# Patient Record
Sex: MALE | Race: WHITE | NOT HISPANIC OR LATINO
[De-identification: names, ages, dates, MRNs, and addresses within clinical notes are randomized per-mention and may not be internally consistent; named-entity substitution may affect disease eponyms.]

---

## 2017-03-16 ENCOUNTER — TRANSCRIPTION ENCOUNTER (OUTPATIENT)
Age: 72
End: 2017-03-16

## 2017-06-07 ENCOUNTER — APPOINTMENT (OUTPATIENT)
Dept: CARDIOLOGY | Facility: CLINIC | Age: 72
End: 2017-06-07

## 2017-07-13 ENCOUNTER — APPOINTMENT (OUTPATIENT)
Dept: CARDIOLOGY | Facility: CLINIC | Age: 72
End: 2017-07-13

## 2017-09-07 ENCOUNTER — APPOINTMENT (OUTPATIENT)
Dept: SURGERY | Facility: CLINIC | Age: 72
End: 2017-09-07
Payer: MEDICARE

## 2017-09-07 VITALS
BODY MASS INDEX: 30.52 KG/M2 | RESPIRATION RATE: 16 BRPM | SYSTOLIC BLOOD PRESSURE: 155 MMHG | WEIGHT: 218 LBS | HEIGHT: 71 IN | TEMPERATURE: 97.9 F | OXYGEN SATURATION: 100 % | DIASTOLIC BLOOD PRESSURE: 87 MMHG | HEART RATE: 71 BPM

## 2017-09-07 DIAGNOSIS — Z80.7 FAMILY HISTORY OF OTHER MALIGNANT NEOPLASMS OF LYMPHOID, HEMATOPOIETIC AND RELATED TISSUES: ICD-10-CM

## 2017-09-07 DIAGNOSIS — Z78.9 OTHER SPECIFIED HEALTH STATUS: ICD-10-CM

## 2017-09-07 DIAGNOSIS — Z82.49 FAMILY HISTORY OF ISCHEMIC HEART DISEASE AND OTHER DISEASES OF THE CIRCULATORY SYSTEM: ICD-10-CM

## 2017-09-07 DIAGNOSIS — Z87.09 PERSONAL HISTORY OF OTHER DISEASES OF THE RESPIRATORY SYSTEM: ICD-10-CM

## 2017-09-07 DIAGNOSIS — Z80.42 FAMILY HISTORY OF MALIGNANT NEOPLASM OF PROSTATE: ICD-10-CM

## 2017-09-07 PROCEDURE — 99204 OFFICE O/P NEW MOD 45 MIN: CPT

## 2017-09-07 RX ORDER — OXYCODONE 5 MG/1
5 TABLET ORAL
Qty: 20 | Refills: 0 | Status: DISCONTINUED | COMMUNITY
Start: 2017-06-02

## 2017-09-07 RX ORDER — ASPIRIN 325 MG
325 TABLET ORAL
Qty: 30 | Refills: 0 | Status: DISCONTINUED | COMMUNITY
Start: 2017-06-02

## 2017-09-07 RX ORDER — UBIDECARENONE/VIT E ACET 100MG-5
1000 CAPSULE ORAL
Refills: 0 | Status: ACTIVE | COMMUNITY

## 2017-09-07 RX ORDER — ROSUVASTATIN CALCIUM 10 MG/1
10 TABLET, FILM COATED ORAL
Qty: 30 | Refills: 0 | Status: DISCONTINUED | COMMUNITY
Start: 2017-08-15

## 2017-09-07 RX ORDER — PANTOPRAZOLE 40 MG/1
40 TABLET, DELAYED RELEASE ORAL
Qty: 30 | Refills: 0 | Status: DISCONTINUED | COMMUNITY
Start: 2017-06-02

## 2017-09-07 RX ORDER — FUROSEMIDE 40 MG/1
40 TABLET ORAL
Qty: 30 | Refills: 0 | Status: DISCONTINUED | COMMUNITY
Start: 2017-06-02

## 2017-09-07 RX ORDER — PREDNISONE 20 MG/1
20 TABLET ORAL
Qty: 10 | Refills: 0 | Status: DISCONTINUED | COMMUNITY
Start: 2017-03-16

## 2017-09-07 RX ORDER — POTASSIUM CHLORIDE 1500 MG/1
20 TABLET, EXTENDED RELEASE ORAL
Qty: 30 | Refills: 0 | Status: DISCONTINUED | COMMUNITY
Start: 2017-06-02

## 2017-09-07 RX ORDER — ZOLPIDEM TARTRATE 10 MG/1
10 TABLET ORAL
Qty: 30 | Refills: 0 | Status: DISCONTINUED | COMMUNITY
Start: 2017-03-15

## 2017-09-07 RX ORDER — CIPROFLOXACIN AND DEXAMETHASONE 3; 1 MG/ML; MG/ML
0.3-0.1 SUSPENSION/ DROPS AURICULAR (OTIC)
Qty: 75 | Refills: 0 | Status: DISCONTINUED | COMMUNITY
Start: 2017-07-26

## 2017-09-07 RX ORDER — HYDROCHLOROTHIAZIDE 25 MG/1
25 TABLET ORAL
Qty: 90 | Refills: 0 | Status: DISCONTINUED | COMMUNITY
Start: 2017-04-26

## 2017-09-07 RX ORDER — SIMVASTATIN 20 MG/1
20 TABLET, FILM COATED ORAL
Qty: 30 | Refills: 0 | Status: DISCONTINUED | COMMUNITY
Start: 2017-06-22

## 2017-09-07 RX ORDER — QUINAPRIL HYDROCHLORIDE 10 MG/1
10 TABLET, FILM COATED ORAL
Qty: 90 | Refills: 0 | Status: DISCONTINUED | COMMUNITY
Start: 2017-04-26

## 2017-09-20 ENCOUNTER — APPOINTMENT (OUTPATIENT)
Dept: SURGERY | Facility: HOSPITAL | Age: 72
End: 2017-09-20

## 2017-10-02 ENCOUNTER — APPOINTMENT (OUTPATIENT)
Dept: SURGERY | Facility: CLINIC | Age: 72
End: 2017-10-02
Payer: MEDICARE

## 2017-10-02 VITALS
DIASTOLIC BLOOD PRESSURE: 88 MMHG | SYSTOLIC BLOOD PRESSURE: 162 MMHG | HEART RATE: 79 BPM | BODY MASS INDEX: 30.52 KG/M2 | WEIGHT: 218 LBS | HEIGHT: 71 IN

## 2017-10-02 PROCEDURE — 99203 OFFICE O/P NEW LOW 30 MIN: CPT

## 2017-10-05 ENCOUNTER — RECORD ABSTRACTING (OUTPATIENT)
Age: 72
End: 2017-10-05

## 2017-10-05 DIAGNOSIS — R42 DIZZINESS AND GIDDINESS: ICD-10-CM

## 2017-10-05 DIAGNOSIS — I25.2 OLD MYOCARDIAL INFARCTION: ICD-10-CM

## 2017-10-05 DIAGNOSIS — Z87.09 PERSONAL HISTORY OF OTHER DISEASES OF THE RESPIRATORY SYSTEM: ICD-10-CM

## 2017-10-05 RX ORDER — ZOLPIDEM TARTRATE 10 MG/1
10 TABLET, FILM COATED ORAL DAILY
Refills: 0 | Status: ACTIVE | COMMUNITY

## 2017-10-05 RX ORDER — ALLOPURINOL 300 MG/1
300 TABLET ORAL DAILY
Refills: 0 | Status: ACTIVE | COMMUNITY

## 2017-10-27 ENCOUNTER — APPOINTMENT (OUTPATIENT)
Dept: CARDIOLOGY | Facility: CLINIC | Age: 72
End: 2017-10-27
Payer: MEDICARE

## 2017-10-27 VITALS
BODY MASS INDEX: 30.66 KG/M2 | OXYGEN SATURATION: 95 % | SYSTOLIC BLOOD PRESSURE: 140 MMHG | DIASTOLIC BLOOD PRESSURE: 72 MMHG | WEIGHT: 219 LBS | HEIGHT: 71 IN | HEART RATE: 66 BPM

## 2017-10-27 DIAGNOSIS — Z00.00 ENCOUNTER FOR GENERAL ADULT MEDICAL EXAMINATION W/OUT ABNORMAL FINDINGS: ICD-10-CM

## 2017-10-27 DIAGNOSIS — E78.00 PURE HYPERCHOLESTEROLEMIA, UNSPECIFIED: ICD-10-CM

## 2017-10-27 DIAGNOSIS — K40.90 UNILATERAL INGUINAL HERNIA, W/OUT OBSTRUCTION OR GANGRENE, NOT SPECIFIED AS RECURRENT: ICD-10-CM

## 2017-10-27 PROCEDURE — 99215 OFFICE O/P EST HI 40 MIN: CPT

## 2017-10-27 PROCEDURE — 93000 ELECTROCARDIOGRAM COMPLETE: CPT

## 2017-10-27 RX ORDER — SIMVASTATIN 20 MG/1
20 TABLET, FILM COATED ORAL DAILY
Refills: 0 | Status: DISCONTINUED | COMMUNITY
End: 2017-10-27

## 2017-10-27 RX ORDER — ROSUVASTATIN CALCIUM 20 MG/1
20 TABLET, FILM COATED ORAL
Refills: 0 | Status: DISCONTINUED | COMMUNITY
End: 2017-10-27

## 2017-11-17 ENCOUNTER — APPOINTMENT (OUTPATIENT)
Dept: SURGERY | Facility: AMBULATORY SURGERY CENTER | Age: 72
End: 2017-11-17

## 2017-11-29 ENCOUNTER — OUTPATIENT (OUTPATIENT)
Dept: OUTPATIENT SERVICES | Facility: HOSPITAL | Age: 72
LOS: 1 days | End: 2017-11-29

## 2017-11-29 VITALS
SYSTOLIC BLOOD PRESSURE: 160 MMHG | RESPIRATION RATE: 16 BRPM | DIASTOLIC BLOOD PRESSURE: 94 MMHG | HEIGHT: 72 IN | WEIGHT: 222.01 LBS | HEART RATE: 60 BPM | TEMPERATURE: 98 F

## 2017-11-29 DIAGNOSIS — Z95.1 PRESENCE OF AORTOCORONARY BYPASS GRAFT: Chronic | ICD-10-CM

## 2017-11-29 DIAGNOSIS — I25.10 ATHEROSCLEROTIC HEART DISEASE OF NATIVE CORONARY ARTERY WITHOUT ANGINA PECTORIS: ICD-10-CM

## 2017-11-29 DIAGNOSIS — K40.90 UNILATERAL INGUINAL HERNIA, WITHOUT OBSTRUCTION OR GANGRENE, NOT SPECIFIED AS RECURRENT: ICD-10-CM

## 2017-11-29 DIAGNOSIS — Z98.61 CORONARY ANGIOPLASTY STATUS: Chronic | ICD-10-CM

## 2017-11-29 DIAGNOSIS — G47.33 OBSTRUCTIVE SLEEP APNEA (ADULT) (PEDIATRIC): ICD-10-CM

## 2017-11-29 DIAGNOSIS — Z98.890 OTHER SPECIFIED POSTPROCEDURAL STATES: Chronic | ICD-10-CM

## 2017-11-29 RX ORDER — ASPIRIN/CALCIUM CARB/MAGNESIUM 324 MG
1 TABLET ORAL
Qty: 0 | Refills: 0 | COMMUNITY

## 2017-11-29 RX ORDER — ERGOCALCIFEROL 1.25 MG/1
1 CAPSULE ORAL
Qty: 0 | Refills: 0 | COMMUNITY

## 2017-11-29 RX ORDER — CLOPIDOGREL BISULFATE 75 MG/1
1 TABLET, FILM COATED ORAL
Qty: 0 | Refills: 0 | COMMUNITY

## 2017-11-29 RX ORDER — FOLIC ACID 0.8 MG
1 TABLET ORAL
Qty: 0 | Refills: 0 | COMMUNITY

## 2017-11-29 RX ORDER — ROSUVASTATIN CALCIUM 5 MG/1
1 TABLET ORAL
Qty: 0 | Refills: 0 | COMMUNITY

## 2017-11-29 RX ORDER — ZOLPIDEM TARTRATE 10 MG/1
1 TABLET ORAL
Qty: 0 | Refills: 0 | COMMUNITY

## 2017-11-29 RX ORDER — METOPROLOL TARTRATE 50 MG
1 TABLET ORAL
Qty: 0 | Refills: 0 | COMMUNITY

## 2017-11-29 RX ORDER — ALLOPURINOL 300 MG
1 TABLET ORAL
Qty: 0 | Refills: 0 | COMMUNITY

## 2017-11-29 NOTE — H&P PST ADULT - FAMILY HISTORY
Family history of heart disease, brother age 55 MI, father   CABG X3, & mother       Father  Still living? Unknown  Family history of cancer, Age at diagnosis: Age Unknown     Mother  Still living? No  Family history of cancer, Age at diagnosis: Age Unknown

## 2017-11-29 NOTE — H&P PST ADULT - PMH
CAD (coronary artery disease)    Dyslipidemia    Gout    Hypertension    Inguinal hernia    Insomnia    Obstructive sleep apnea

## 2017-11-29 NOTE — H&P PST ADULT - DENTITION
After Visit Summary   7/26/2017    Aries Hilario    MRN: [id number]           Patient Information     Date Of Birth          1954        Visit Information        Provider Department      7/26/2017 10:20 AM Judy Peña APRN CNP [facility] PHYSICIAN HEART AT [facility]        Today's Diagnoses     Chest pain    -  1    CAD (coronary artery disease)        Unstable angina pectoris (H)          Care Instructions    Thank you for your  Heart Care visit today. Your provider has recommended the following:  Medication Changes:   1. START Imdur (Isosorbide Mononitrate) 30 mg every morning.  2. Use Nitroglycerin under your tongue as needed for chest pain rated 5/10 or greater.  Recommendations:  1. Cardiac Catheterization (angiogram) to be done at [facility] on: Friday, July 28th. Arrive at: 11:00 am.  If you need to contact [facility] for any reason, please call [phone]. Follow the instructions below.  Follow-up:  See Suyapa Nava NP for cardiology follow up in [place], [place] on: Thursday, August 3rd at 10:20 am to discuss the result and your plan of care going forward.  We kindly ask that you call cardiology scheduling at [phone] three months prior to requested revisit date to schedule future cardiology appointments.  Reminder:  1. Please bring in your current medication list or your medication, over the counter supplements and vitamin bottles as we will review these at each office visit.               [facility] HEART CARE  [facility]~[address]~[place], [place]~[address]  Questions about your visit today?  Call your Cardiology Clinic RN's-Sowmya Beck and/or Chasity Sanchez at [phone].  ANGIOGRAM  [facility]   [place], [place]   Contact [facility] scheduling if needed at [phone], Option#2 or [phone].      1. In preparation for your procedure, we require that you do the  "following:    a. Nothing to eat or drink after midnight if your procedure is before 12 noon.  b. Take your usual morning medications with a small sip of water on the day of your procedure unless you are on the following medications:    Aspirin:  o If you currently take Aspirin, continue taking your same dose.    Coumadin or Warfarin:  o Stop Coumadin or Warfarin on:  __________________________    Pradaxa or Xarelto:  o Stop Pradaxa or Xarelto on:  ______________________________    Diabetic:  o If you take Glucophage (metformin) do not take the morning of the procedure or for 2 days after the procedure. Contact your Primary Care MD regarding glucose control for the days you do not take Glucophage.  o If you are on other oral diabetic medications do not take on the morning of the procedure.  o If you take Insulin contact your Primary Care MD for the recommended dosage to take the morning of the procedure.    Diuretic (Water Pill):  o If you take a diuretic (water pill), do not take the morning of the procedure.    c. If you have an allergy to dye, please contact the [facility] office 4 days before your procedure at [phone] option 2, and ask for a nurse.    2. You will be unable to drive after your procedure; please arrange to have someone drive you home the day of your procedure. You will also need to make sure that there is a responsible adult with you for 24 hours after your procedure. Your procedure will be cancelled if you do not have transportation home or someone to stay with you for 24 hrs.     3. Your procedure will be done at [facility]. Please park in the  Skyway Ramp  on the west side of [facility] on [address]. Take the skyway over [facility] to the hospital. Please check in on the first floor, \"Skyway Welcome Desk\" which is one floor down from the skyway level.     4. If you have any questions about your upcoming procedure, please contact the nurse at [facility] at " [phone] or the nurse at [facility] at [phone], option#2.              Follow-ups after your visit        Your next 10 appointments already scheduled     Jul 28, 2017  1:00 PM CDT   Cath 90 Minute with SHCVR1   [facility] Cardiac Catheterization [facility] ([facility])    [address]  [place] [address]   [phone]            Aug 03, 2017 10:20 AM CDT   Return Visit with MERCY Ford CNP   [facility] PHYSICIAN HEART AT [facility] ([facility])    [address] [address] [phone]              Future tests that were ordered for you today     Open Future Orders        Priority Expected Expires Ordered    Cardiac Cath: Coronary Angiography Adult Order Routine 8/2/2017 7/21/2018 7/26/2017            Who to contact     If you have questions or need follow up information about today's clinic visit or your schedule please contact [facility] PHYSICIAN HEART AT [facility] directly at [phone].  Normal or non-critical lab and imaging results will be communicated to you by Kwaabhart, letter or phone within 4 business days after the clinic has received the results. If you do not hear from us within 7 days, please contact the clinic through Helpr or phone. If you have a critical or abnormal lab result, we will notify you by phone as soon as possible.  Submit refill requests through Helpr or call your pharmacy and they will forward the refill request to us. Please allow 3 business days for your refill to be completed.          Additional Information About Your Visit        Helpr Information     Helpr gives you secure access to your electronic health record. If you see a primary care provider, you can also send messages to your care team and make appointments. If you have questions, please call your primary care clinic.  If you do not have a primary care provider, please call [phone]  and they will assist you.        Care EveryWhere ID     This is your Care EveryWhere ID. This could be used by other organizations to access your [place] medical records  [id number]         Blood Pressure from Last 3 Encounters:   12/22/16 123/74   12/19/16 124/78   10/28/16 104/64    Weight from Last 3 Encounters:   12/22/16 107 kg (236 lb)   12/19/16 107 kg (236 lb)   10/28/16 102.8 kg (226 lb 9.6 oz)                 Today's Medication Changes          These changes are accurate as of: 7/26/17 11:10 AM.  If you have any questions, ask your nurse or doctor.               Start taking these medicines.        Dose/Directions    isosorbide mononitrate 30 MG 24 hr tablet   Commonly known as:  IMDUR   Used for:  Unstable angina pectoris (H)        Dose:  30 mg   Take 1 tablet (30 mg) by mouth daily   Quantity:  30 tablet   Refills:  3       nitroGLYcerin 0.4 MG sublingual tablet   Commonly known as:  NITROSTAT   Used for:  Unstable angina pectoris (H)        For chest pain place 1 tablet under the tongue every 5 minutes for 3 doses. If symptoms persist 5 minutes after 1st dose call 911.   Quantity:  25 tablet   Refills:  3         These medicines have changed or have updated prescriptions.        Dose/Directions    buPROPion 100 MG tablet   Commonly known as:  WELLBUTRIN   This may have changed:    - how much to take  - when to take this   Used for:  Moderate episode of recurrent major depressive disorder (H)        Dose:  75 mg   Take 0.75 tablets (75 mg) by mouth 2 times daily   Quantity:  90 tablet   Refills:  0         Stop taking these medicines if you haven't already. Please contact your care team if you have questions.     BRILINTA PO           lisinopril 10 MG tablet   Commonly known as:  PRINIVIL/ZESTRIL           sildenafil 100 MG cap/tab   Commonly known as:  VIAGRA                Where to get your medicines      These medications were sent to [facility] - [place], [place] - [address]   [address] [address]     Phone:  [phone]     isosorbide mononitrate 30 MG 24 hr tablet    nitroGLYcerin 0.4 MG sublingual tablet                Primary Care Provider Office Phone # Fax #    Elsa Hutchinson [phone]       [facility] [address]   [place] [facility] [address]        Equal Access to Services     JES BLAIR : Hadii aad ku hadasho Soomaali, waaxda luqadaha, qaybta kaalmada adeegyada, waxay idiin hayaan adeeg kharash la'aan ah. So [facility] [phone].    ATENCIÓN: Si habla espraj, tiene a bell disposición servicios gratuitos de asistencia lingüística. Llame al [phone].    We comply with applicable federal civil rights laws and [place] laws. We do not discriminate on the basis of race, color, national origin, age, disability sex, sexual orientation or gender identity.            Thank you!     Thank you for choosing [facility] PHYSICIAN HEART AT [facility]  for your care. Our goal is always to provide you with excellent care. Hearing back from our patients is one way we can continue to improve our services. Please take a few minutes to complete the written survey that you may receive in the mail after your visit with us. Thank you!             Your Updated Medication List - Protect others around you: Learn how to safely use, store and throw away your medicines at www.disposemymeds.org.          This list is accurate as of: 7/26/17 11:10 AM.  Always use your most recent med list.                   Brand Name Dispense Instructions for use Diagnosis    aspirin 81 MG tablet     30 tablet    Take 1 tablet (81 mg) by mouth daily    Coronary artery disease involving native coronary artery of native heart without angina pectoris       atorvastatin 40 MG tablet    LIPITOR    90 tablet    Take 1 tablet (40 mg) by mouth daily    Hyperlipidemia LDL goal <70       buPROPion 100 MG tablet    WELLBUTRIN    90 tablet    Take 0.75 tablets (75 mg) by  mouth 2 times daily    Moderate episode of recurrent major depressive disorder (H)       clopidogrel 75 MG tablet    PLAVIX    90 tablet    Take 1 tablet (75 mg) by mouth daily    NSTEMI (non-ST elevated myocardial infarction) (H)       hydrocortisone 2.5 % cream    ANUSOL-HC    28.35 g    Place rectally 2 times daily For up to one week    Anal pain       IRON SUPPLEMENT PO      Take 325 mg by mouth daily        isosorbide mononitrate 30 MG 24 hr tablet    IMDUR    30 tablet    Take 1 tablet (30 mg) by mouth daily    Unstable angina pectoris (H)       metoprolol 25 MG 24 hr tablet    TOPROL-XL    45 tablet    Take 1/2 tablet by mouth daily.    NSTEMI (non-ST elevated myocardial infarction) (H)       nitroGLYcerin 0.4 MG sublingual tablet    NITROSTAT    25 tablet    For chest pain place 1 tablet under the tongue every 5 minutes for 3 doses. If symptoms persist 5 minutes after 1st dose call 911.    Unstable angina pectoris (H)       oxybutynin 5 MG tablet    DITROPAN    60 tablet    Take 1-2 tablets (5-10 mg) by mouth At Bedtime    Erectile dysfunction due to arterial insufficiency       PROBIOTIC ADVANCED PO              denies loose teeth or dentures

## 2017-11-29 NOTE — H&P PST ADULT - PROBLEM SELECTOR PLAN 1
scheduled for right inguinal hernia repair on 12/8/2017  preop instructions, gi prophylaxis & surgical soap given  pt verbalized understanding

## 2017-11-29 NOTE — H&P PST ADULT - PRO PAIN LIFE ADAPT
decreased activity level/inability to sleep/decreased appetite/inability to concentrate/inability to work

## 2017-11-29 NOTE — H&P PST ADULT - PROBLEM SELECTOR PLAN 2
Eleinta Velázquez  (RN)  2017 11:38:26
pt on plavix and aspirin , s/p CABG X2 vessels in 5/2017  confirmed with Jossy in Dr Held Office pt to remain on plavix & aspirin.  Pending copy of cardiology eval & cath report   pt instructed to take metoprolol & allopurinol on morning of surgery.  copy of labs results & ekg report in chart

## 2017-11-29 NOTE — H&P PST ADULT - LAST CARDIAC ANGIOGRAM/IMAGING
[facility]  5/29/2017  non functioning stent [facility]  5/29/2017  "non functioning stent"

## 2017-11-29 NOTE — H&P PST ADULT - HISTORY OF PRESENT ILLNESS
71y/o male presents for preop eval for scheduled right inguinal hernia repair on 12/8/2017.   Pt states self detected on 12/4/2017 after experiencing discomfort in the area

## 2017-11-29 NOTE — H&P PST ADULT - NEGATIVE CARDIOVASCULAR SYMPTOMS
no dyspnea on exertion/no claudication/no orthopnea/no peripheral edema/no paroxysmal nocturnal dyspnea/no palpitations/no chest pain

## 2017-11-29 NOTE — H&P PST ADULT - PSH
H/O coronary angioplasty  2017  "1 stent inserted that was bypassed in May 2017"  H/O sinus surgery  X3 1988 - 1997  S/P CABG x 2  5/29/2017

## 2017-11-29 NOTE — H&P PST ADULT - LYMPHATIC
supraclavicular L/anterior cervical L/anterior cervical R/supraclavicular R/posterior cervical L/posterior cervical R

## 2017-12-08 ENCOUNTER — APPOINTMENT (OUTPATIENT)
Dept: SURGERY | Facility: AMBULATORY SURGERY CENTER | Age: 72
End: 2017-12-08

## 2017-12-08 ENCOUNTER — OUTPATIENT (OUTPATIENT)
Dept: OUTPATIENT SERVICES | Facility: HOSPITAL | Age: 72
LOS: 1 days | Discharge: HOME | End: 2017-12-08
Payer: MEDICARE

## 2017-12-08 VITALS
DIASTOLIC BLOOD PRESSURE: 71 MMHG | OXYGEN SATURATION: 97 % | TEMPERATURE: 98 F | HEART RATE: 60 BPM | WEIGHT: 222.01 LBS | SYSTOLIC BLOOD PRESSURE: 159 MMHG | HEIGHT: 72 IN | RESPIRATION RATE: 18 BRPM

## 2017-12-08 VITALS — TEMPERATURE: 97 F

## 2017-12-08 DIAGNOSIS — Z95.1 PRESENCE OF AORTOCORONARY BYPASS GRAFT: Chronic | ICD-10-CM

## 2017-12-08 DIAGNOSIS — Z98.61 CORONARY ANGIOPLASTY STATUS: Chronic | ICD-10-CM

## 2017-12-08 DIAGNOSIS — K40.90 UNILATERAL INGUINAL HERNIA, WITHOUT OBSTRUCTION OR GANGRENE, NOT SPECIFIED AS RECURRENT: ICD-10-CM

## 2017-12-08 DIAGNOSIS — Z98.890 OTHER SPECIFIED POSTPROCEDURAL STATES: Chronic | ICD-10-CM

## 2017-12-08 PROCEDURE — 49505 PRP I/HERN INIT REDUC >5 YR: CPT | Mod: AS

## 2017-12-08 PROCEDURE — 49505 PRP I/HERN INIT REDUC >5 YR: CPT

## 2017-12-08 NOTE — ASU DISCHARGE PLAN (ADULT/PEDIATRIC). - NOTIFY
Swelling that continues/Unable to Urinate/Fever greater than 101/Bleeding that does not stop/Persistent Nausea and Vomiting

## 2017-12-08 NOTE — BRIEF OPERATIVE NOTE - PROCEDURE
<<-----Click on this checkbox to enter Procedure Herniorrhaphy, inguinal, right  12/08/2017  with progrip mesh  Active  ADEOLVEI

## 2017-12-09 ENCOUNTER — TRANSCRIPTION ENCOUNTER (OUTPATIENT)
Age: 72
End: 2017-12-09

## 2017-12-13 ENCOUNTER — APPOINTMENT (OUTPATIENT)
Dept: SURGERY | Facility: CLINIC | Age: 72
End: 2017-12-13
Payer: MEDICARE

## 2017-12-13 VITALS
TEMPERATURE: 97.4 F | DIASTOLIC BLOOD PRESSURE: 78 MMHG | SYSTOLIC BLOOD PRESSURE: 145 MMHG | HEART RATE: 64 BPM | HEIGHT: 71 IN | BODY MASS INDEX: 30.66 KG/M2 | WEIGHT: 219 LBS

## 2017-12-13 PROCEDURE — 99024 POSTOP FOLLOW-UP VISIT: CPT

## 2017-12-27 ENCOUNTER — APPOINTMENT (OUTPATIENT)
Dept: SURGERY | Facility: CLINIC | Age: 72
End: 2017-12-27
Payer: MEDICARE

## 2017-12-27 ENCOUNTER — APPOINTMENT (OUTPATIENT)
Dept: SURGERY | Facility: CLINIC | Age: 72
End: 2017-12-27

## 2017-12-27 VITALS
SYSTOLIC BLOOD PRESSURE: 138 MMHG | BODY MASS INDEX: 30.66 KG/M2 | DIASTOLIC BLOOD PRESSURE: 76 MMHG | HEIGHT: 71 IN | TEMPERATURE: 98.3 F | HEART RATE: 66 BPM | WEIGHT: 219 LBS

## 2017-12-27 DIAGNOSIS — Z09 ENCOUNTER FOR FOLLOW-UP EXAMINATION AFTER COMPLETED TREATMENT FOR CONDITIONS OTHER THAN MALIGNANT NEOPLASM: ICD-10-CM

## 2017-12-27 PROCEDURE — 99024 POSTOP FOLLOW-UP VISIT: CPT

## 2018-05-03 ENCOUNTER — APPOINTMENT (OUTPATIENT)
Dept: CARDIOLOGY | Facility: CLINIC | Age: 73
End: 2018-05-03

## 2018-09-05 ENCOUNTER — TRANSCRIPTION ENCOUNTER (OUTPATIENT)
Age: 73
End: 2018-09-05

## 2018-09-19 PROBLEM — K40.90 UNILATERAL INGUINAL HERNIA, WITHOUT OBSTRUCTION OR GANGRENE, NOT SPECIFIED AS RECURRENT: Chronic | Status: ACTIVE | Noted: 2017-11-29

## 2018-09-19 PROBLEM — I25.10 ATHEROSCLEROTIC HEART DISEASE OF NATIVE CORONARY ARTERY WITHOUT ANGINA PECTORIS: Chronic | Status: ACTIVE | Noted: 2017-11-29

## 2018-09-19 PROBLEM — I10 ESSENTIAL (PRIMARY) HYPERTENSION: Chronic | Status: ACTIVE | Noted: 2017-11-29

## 2018-09-19 PROBLEM — M10.9 GOUT, UNSPECIFIED: Chronic | Status: ACTIVE | Noted: 2017-11-29

## 2018-09-19 PROBLEM — E78.5 HYPERLIPIDEMIA, UNSPECIFIED: Chronic | Status: ACTIVE | Noted: 2017-11-29

## 2018-09-19 PROBLEM — G47.33 OBSTRUCTIVE SLEEP APNEA (ADULT) (PEDIATRIC): Chronic | Status: ACTIVE | Noted: 2017-11-29

## 2018-09-19 PROBLEM — G47.00 INSOMNIA, UNSPECIFIED: Chronic | Status: ACTIVE | Noted: 2017-11-29

## 2018-09-21 ENCOUNTER — APPOINTMENT (OUTPATIENT)
Dept: CT IMAGING | Facility: CLINIC | Age: 73
End: 2018-09-21
Payer: MEDICARE

## 2018-09-21 ENCOUNTER — OUTPATIENT (OUTPATIENT)
Dept: OUTPATIENT SERVICES | Facility: HOSPITAL | Age: 73
LOS: 1 days | End: 2018-09-21
Payer: MEDICARE

## 2018-09-21 DIAGNOSIS — Z95.1 PRESENCE OF AORTOCORONARY BYPASS GRAFT: Chronic | ICD-10-CM

## 2018-09-21 DIAGNOSIS — Z00.8 ENCOUNTER FOR OTHER GENERAL EXAMINATION: ICD-10-CM

## 2018-09-21 DIAGNOSIS — Z98.61 CORONARY ANGIOPLASTY STATUS: Chronic | ICD-10-CM

## 2018-09-21 DIAGNOSIS — Z98.890 OTHER SPECIFIED POSTPROCEDURAL STATES: Chronic | ICD-10-CM

## 2018-09-21 PROCEDURE — 70486 CT MAXILLOFACIAL W/O DYE: CPT

## 2018-09-21 PROCEDURE — 70486 CT MAXILLOFACIAL W/O DYE: CPT | Mod: 26

## 2019-01-17 ENCOUNTER — TRANSCRIPTION ENCOUNTER (OUTPATIENT)
Age: 74
End: 2019-01-17

## 2020-09-04 NOTE — ASU PREOPERATIVE ASSESSMENT, ADULT ([facility]ARK ONLY) - SPO2 (%)
[Consult Letter:] : I had the pleasure of evaluating your patient, [unfilled]. [Dear  ___] : Dear  [unfilled], [Consult Closing:] : Thank you very much for allowing me to participate in the care of this patient.  If you have any questions, please do not hesitate to contact me. [Sincerely,] : Sincerely, [Please see my note below.] : Please see my note below. [FreeTextEntry3] : Christian Connelly M.D., F.A.C.S, F.A.S.C.R.S [DrDrake  ___] : Dr. HEBERT 97

## 2023-09-07 ENCOUNTER — RESULT CHARGE (OUTPATIENT)
Age: 78
End: 2023-09-07

## 2023-09-08 ENCOUNTER — APPOINTMENT (OUTPATIENT)
Dept: CARDIOLOGY | Facility: CLINIC | Age: 78
End: 2023-09-08
Payer: MEDICARE

## 2023-09-08 VITALS
OXYGEN SATURATION: 99 % | WEIGHT: 234 LBS | SYSTOLIC BLOOD PRESSURE: 150 MMHG | HEART RATE: 56 BPM | BODY MASS INDEX: 32.64 KG/M2 | DIASTOLIC BLOOD PRESSURE: 70 MMHG

## 2023-09-08 PROCEDURE — 99214 OFFICE O/P EST MOD 30 MIN: CPT | Mod: 25

## 2023-09-08 PROCEDURE — 99204 OFFICE O/P NEW MOD 45 MIN: CPT | Mod: 25

## 2023-09-08 RX ORDER — NEBIVOLOL HYDROCHLORIDE 5 MG/1
5 TABLET ORAL DAILY
Refills: 0 | Status: COMPLETED | COMMUNITY

## 2023-09-08 RX ORDER — METOPROLOL SUCCINATE 50 MG/1
50 TABLET, EXTENDED RELEASE ORAL
Qty: 90 | Refills: 0 | Status: DISCONTINUED | COMMUNITY
Start: 2017-10-24 | End: 2023-09-08

## 2023-09-08 RX ORDER — FERROUS SULFATE 325(65) MG
325 (65 FE) TABLET ORAL DAILY
Refills: 0 | Status: DISCONTINUED | COMMUNITY
End: 2023-09-08

## 2023-09-08 RX ORDER — ROSUVASTATIN CALCIUM 10 MG/1
10 TABLET, FILM COATED ORAL
Qty: 90 | Refills: 3 | Status: ACTIVE | COMMUNITY
Start: 2017-10-27 | End: 1900-01-01

## 2023-09-08 RX ORDER — AMOXICILLIN 500 MG/1
500 TABLET, FILM COATED ORAL
Qty: 8 | Refills: 2 | Status: ACTIVE | COMMUNITY
Start: 2023-09-08 | End: 1900-01-01

## 2023-09-08 RX ORDER — TAMSULOSIN HYDROCHLORIDE 0.4 MG/1
0.4 CAPSULE ORAL
Refills: 0 | Status: COMPLETED | COMMUNITY

## 2023-09-08 RX ORDER — ASPIRIN 325 MG/1
325 TABLET, FILM COATED ORAL DAILY
Refills: 0 | Status: DISCONTINUED | COMMUNITY
End: 2023-09-08

## 2023-09-08 RX ORDER — FOLIC ACID 20 MG
CAPSULE ORAL
Refills: 0 | Status: DISCONTINUED | COMMUNITY
End: 2023-09-08

## 2023-09-08 RX ORDER — METOPROLOL TARTRATE 25 MG/1
25 TABLET, FILM COATED ORAL DAILY
Qty: 90 | Refills: 0 | Status: DISCONTINUED | COMMUNITY
Start: 2017-07-13 | End: 2023-09-08

## 2023-09-08 RX ORDER — CLOPIDOGREL BISULFATE 75 MG/1
75 TABLET, FILM COATED ORAL
Qty: 90 | Refills: 0 | Status: DISCONTINUED | COMMUNITY
Start: 2017-07-13 | End: 2023-09-08

## 2023-09-08 RX ORDER — CHROMIUM 200 MCG
800 TABLET ORAL DAILY
Refills: 0 | Status: DISCONTINUED | COMMUNITY
End: 2023-09-08

## 2023-09-10 NOTE — HISTORY OF PRESENT ILLNESS
[FreeTextEntry1] : Patient is a 77-year-old white male who presents to the office today [place].  He is well-known to me, he has documented CAD, hypertension, hyperlipidemia, mild aortic and mitral insufficiency and presents today with an episode of superficial left upper chest discomfort that he noted after asymptomatically playing tennis today.  Patient is active he exercises on a regular basis and while active has no exertional symptomatology.  He undergoes stress testing on an annual basis and remains withj a mild degree of a reversible apical defect not associated with EKG changes or symptoms.  Patient had a dramatic presentation with acute coronary syndrome in May 2019.  He was taken urgently to [facility] and underwent PTCA and stenting to proximal LAD lesion.  During the procedure there was a small LAD dissection and he required immediate surgery with a LIMA placed to his LAD and a vein to his right coronary artery.  He had a brief episode of paroxysmal atrial fibrillation post intervention that has not recurred and the remainder of his postop hospital course was relatively uneventful.  Patient established his  care with me shortly after that point in time and has been treated aggressively with risk factor modification, annual stress testing and echocardiography to follow mild aortic insufficiency.  His last echo was in January 2023 and was notable for a normal ejection fraction of 55 to 60%.  A mildly calcified mitral valve annulus with mild mitral valve thickening and mild MR.  His left atrium is mildly enlarged at 4.3 cm.  Aortic valve is mildly thickened and calcified with no stenosis but mild aortic insufficiency with a pressure half-time of 690 m/s.  PA pressures were mildly elevated at 43.  Patient presents today noting that he is taking and tolerating his medical regimen.  He remains on low-dose aspirin, Crestor at 10 mg/day and Bystolic 5 mg/day.  He is aware that SBE prophylaxis need to be taken before appropriate procedures for his aortic insufficiency and otherwise aside from the episode today which is characteristic of musculoskeletal discomfort he is due for stress testing which will be coordinated near his home in [place].  Otherwise has no new medical or cardiovascular concerns at this time.
No

## 2023-09-10 NOTE — REASON FOR VISIT
[CV Risk Factors and Non-Cardiac Disease] : CV risk factors and non-cardiac disease [Structural Heart and Valve Disease] : structural heart and valve disease [Hyperlipidemia] : hyperlipidemia [Hypertension] : hypertension [Other: ____] : [unfilled] [FreeTextEntry1] : Patient is a 77-year-old white male who presents the office today to [place].  He is well-known to to my practice since 2019 when he presented to the office after having an acute coronary syndrome requiring urgent PTCA and stenting but ultimately coronary bypass surgery at [facility].  Patient presents today with an episode of left upper chest discomfort, superficial in nature occurring after tennis not during tennis and to continue his ongoing care here.

## 2023-09-10 NOTE — CARDIOLOGY SUMMARY
[de-identified] : (6/17/2021).  Stress test results: No EKG evidence of ischemia near maximum predicted heart rate.  Asymptomatic.  Negative for angina.  Normal blood pressure response exercise.  Images revealed a small sized minimal intensity mostly reversible perfusion defect involving the left ventricular mid basal anterior wall consistent with mild ischemia in the mid basal anterior region.  Normal LV contractility and stable compared with 12/11/2019 [de-identified] :  January 2023 and was notable for a normal ejection fraction of 55 to 60%.  A mildly calcified mitral valve annulus with mild mitral valve thickening and mild MR.  His left atrium is mildly enlarged at 4.3 cm.  Aortic valve is mildly thickened and calcified with no stenosis but mild aortic insufficiency with a pressure half-time of 690 m/s.  PA pressures were mildly elevated at 43.

## 2023-09-10 NOTE — DISCUSSION/SUMMARY
[FreeTextEntry1] : Patient is a 77-year-old white male with documented CAD.  He is status post acute coronary syndrome in  2019 requiring emergency PTCA and stenting resulting in a dissection and need for immediate bypass surgery performed at [facility].  At that time he underwent a LIMA to his LAD and vein to his right coronary artery.  Patient remains asymptomatic at this time.  He undergoes stress testing on an annual basis and despite no symptoms or EKG changes there is a minimal reversibility in and around a small area of infarct.  This is felt to be artifactual related to his bypass surgery and not representative of ischemic heart disease.  Patient is due for an updated stress test and will schedule that shortly.  Patient has known hypertension, hyperlipidemia mild aortic insufficiency with mild MR.  He does take SBE prophylaxis before all appropriate interventional procedures.  He is taking intolerant of an aggressive antihypertensive and statin therapy and he looks and feels well.  He had some atypical chest pain today not during but after tennis.  He will update his stress test shortly and is being arranged in [place] as he lives in [place].  Patient otherwise looks and feels well.  No changes are made to his medical regimen.  A complete set of laboratory data was obtained and he will be called results, available.  Joel Goldberg, MD, [facility]

## 2023-09-10 NOTE — PHYSICAL EXAM
[Elevated JVD ____cm] : elevated JVD ~Vcm [Murmur] : murmur [No Respiratory Distress] : no respiratory distress  [Well Developed] : well developed [Well Nourished] : well nourished [Obese] : obese [No Acute Distress] : no acute distress [Normal Conjunctiva] : normal conjunctiva [Normal Venous Pressure] : normal venous pressure [No Carotid Bruit] : no carotid bruit [Normal S1, S2] : normal S1, S2 [No Rub] : no rub [Clear Lung Fields] : clear lung fields [Soft] : abdomen soft [Non Tender] : non-tender [No Masses/organomegaly] : no masses/organomegaly [Normal Gait] : normal gait [No Edema] : no edema [No Cyanosis] : no cyanosis [No Clubbing] : no clubbing [No Rash] : no rash [No Skin Lesions] : no skin lesions [Moves all extremities] : moves all extremities [No Focal Deficits] : no focal deficits [de-identified] : Murmur softly radiating bilaterally [de-identified] : Grade 1/6 to 2/6 basal aortic flow murmur [de-identified] : Mild left upper chest wall tenderness to palpation

## 2023-09-10 NOTE — REVIEW OF SYSTEMS
[Orthopnea] : no orthopnea [Dyspnea on exertion] : not dyspnea during exertion [SOB] : no shortness of breath [Chest Discomfort] : chest discomfort [Lower Ext Edema] : no extremity edema [Leg Claudication] : no intermittent leg claudication [Palpitations] : no palpitations [PND] : no PND [Syncope] : no syncope [Negative] : Neurological [FreeTextEntry5] : No chest discomfort during exertion

## 2023-09-12 RX ORDER — NEBIVOLOL 5 MG/1
5 TABLET ORAL DAILY
Qty: 90 | Refills: 3 | Status: ACTIVE | COMMUNITY
Start: 2023-09-08 | End: 1900-01-01

## 2023-09-13 ENCOUNTER — TRANSCRIPTION ENCOUNTER (OUTPATIENT)
Age: 78
End: 2023-09-13

## 2023-09-14 ENCOUNTER — APPOINTMENT (OUTPATIENT)
Dept: CV DIAGNOSTICS | Facility: HOSPITAL | Age: 78
End: 2023-09-14

## 2023-09-14 ENCOUNTER — OUTPATIENT (OUTPATIENT)
Dept: OUTPATIENT SERVICES | Facility: HOSPITAL | Age: 78
LOS: 1 days | End: 2023-09-14
Payer: MEDICARE

## 2023-09-14 DIAGNOSIS — Z98.890 OTHER SPECIFIED POSTPROCEDURAL STATES: Chronic | ICD-10-CM

## 2023-09-14 DIAGNOSIS — R07.89 OTHER CHEST PAIN: ICD-10-CM

## 2023-09-14 DIAGNOSIS — Z98.61 CORONARY ANGIOPLASTY STATUS: Chronic | ICD-10-CM

## 2023-09-14 DIAGNOSIS — Z95.1 PRESENCE OF AORTOCORONARY BYPASS GRAFT: Chronic | ICD-10-CM

## 2023-09-14 PROCEDURE — 93016 CV STRESS TEST SUPVJ ONLY: CPT | Mod: MH

## 2023-09-14 PROCEDURE — 93017 CV STRESS TEST TRACING ONLY: CPT

## 2023-09-14 PROCEDURE — 93018 CV STRESS TEST I&R ONLY: CPT | Mod: MH

## 2023-09-14 PROCEDURE — A9500: CPT

## 2023-10-06 ENCOUNTER — APPOINTMENT (OUTPATIENT)
Dept: CARDIOLOGY | Facility: CLINIC | Age: 78
End: 2023-10-06
Payer: MEDICARE

## 2023-10-06 VITALS
OXYGEN SATURATION: 98 % | BODY MASS INDEX: 32.08 KG/M2 | SYSTOLIC BLOOD PRESSURE: 142 MMHG | WEIGHT: 230 LBS | DIASTOLIC BLOOD PRESSURE: 64 MMHG | HEART RATE: 58 BPM

## 2023-10-06 PROCEDURE — 99214 OFFICE O/P EST MOD 30 MIN: CPT

## 2023-10-06 RX ORDER — AMLODIPINE BESYLATE 2.5 MG/1
2.5 TABLET ORAL
Qty: 90 | Refills: 3 | Status: ACTIVE | COMMUNITY
Start: 2023-09-13 | End: 1900-01-01

## 2023-10-16 ENCOUNTER — APPOINTMENT (OUTPATIENT)
Dept: CARDIOLOGY | Facility: CLINIC | Age: 78
End: 2023-10-16
Payer: MEDICARE

## 2023-10-16 ENCOUNTER — NON-APPOINTMENT (OUTPATIENT)
Age: 78
End: 2023-10-16

## 2023-10-16 ENCOUNTER — LABORATORY RESULT (OUTPATIENT)
Age: 78
End: 2023-10-16

## 2023-10-16 VITALS — DIASTOLIC BLOOD PRESSURE: 62 MMHG | HEART RATE: 54 BPM | SYSTOLIC BLOOD PRESSURE: 146 MMHG

## 2023-10-16 VITALS
HEIGHT: 71 IN | SYSTOLIC BLOOD PRESSURE: 152 MMHG | HEART RATE: 57 BPM | WEIGHT: 230 LBS | BODY MASS INDEX: 32.2 KG/M2 | DIASTOLIC BLOOD PRESSURE: 76 MMHG | OXYGEN SATURATION: 96 %

## 2023-10-16 DIAGNOSIS — N40.0 BENIGN PROSTATIC HYPERPLASIA WITHOUT LOWER URINARY TRACT SYMPMS: ICD-10-CM

## 2023-10-16 DIAGNOSIS — R73.09 OTHER ABNORMAL GLUCOSE: ICD-10-CM

## 2023-10-16 PROCEDURE — 99204 OFFICE O/P NEW MOD 45 MIN: CPT

## 2023-10-16 PROCEDURE — 93000 ELECTROCARDIOGRAM COMPLETE: CPT

## 2023-10-16 RX ORDER — TAMSULOSIN HYDROCHLORIDE 0.4 MG/1
0.4 CAPSULE ORAL
Qty: 90 | Refills: 3 | Status: ACTIVE | COMMUNITY
Start: 2023-10-16

## 2023-10-17 LAB
ALBUMIN SERPL ELPH-MCNC: 4.7 G/DL
ALP BLD-CCNC: 68 U/L
ALT SERPL-CCNC: 32 U/L
ANION GAP SERPL CALC-SCNC: 13 MMOL/L
AST SERPL-CCNC: 23 U/L
BILIRUB SERPL-MCNC: 0.5 MG/DL
BUN SERPL-MCNC: 17 MG/DL
CALCIUM SERPL-MCNC: 9.9 MG/DL
CHLORIDE SERPL-SCNC: 100 MMOL/L
CHOLEST SERPL-MCNC: 134 MG/DL
CO2 SERPL-SCNC: 24 MMOL/L
CREAT SERPL-MCNC: 0.91 MG/DL
EGFR: 88 ML/MIN/1.73M2
ESTIMATED AVERAGE GLUCOSE: 140 MG/DL
GLUCOSE SERPL-MCNC: 120 MG/DL
HBA1C MFR BLD HPLC: 6.5 %
HCT VFR BLD CALC: 49.3 %
HDLC SERPL-MCNC: 31 MG/DL
HGB BLD-MCNC: 16.5 G/DL
LDLC SERPL CALC-MCNC: 29 MG/DL
MCHC RBC-ENTMCNC: 29.9 PG
MCHC RBC-ENTMCNC: 33.5 GM/DL
MCV RBC AUTO: 89.5 FL
NONHDLC SERPL-MCNC: 103 MG/DL
NT-PROBNP SERPL-MCNC: 107 PG/ML
PLATELET # BLD AUTO: 158 K/UL
POTASSIUM SERPL-SCNC: 4.3 MMOL/L
PROT SERPL-MCNC: 7.2 G/DL
RBC # BLD: 5.51 M/UL
RBC # FLD: 13.4 %
SODIUM SERPL-SCNC: 138 MMOL/L
TRIGL SERPL-MCNC: 537 MG/DL
WBC # FLD AUTO: 7.85 K/UL

## 2023-10-24 ENCOUNTER — NON-APPOINTMENT (OUTPATIENT)
Age: 78
End: 2023-10-24

## 2023-10-24 ENCOUNTER — TRANSCRIPTION ENCOUNTER (OUTPATIENT)
Age: 78
End: 2023-10-24

## 2023-12-11 ENCOUNTER — OFFICE (OUTPATIENT)
Dept: URBAN - METROPOLITAN AREA CLINIC 27 | Facility: CLINIC | Age: 78
Setting detail: OPHTHALMOLOGY
End: 2023-12-11
Payer: MEDICARE

## 2023-12-11 DIAGNOSIS — H25.13: ICD-10-CM

## 2023-12-11 DIAGNOSIS — H40.033: ICD-10-CM

## 2023-12-11 DIAGNOSIS — H43.813: ICD-10-CM

## 2023-12-11 DIAGNOSIS — D18.01: ICD-10-CM

## 2023-12-11 PROCEDURE — 92004 COMPRE OPH EXAM NEW PT 1/>: CPT | Performed by: OPHTHALMOLOGY

## 2023-12-11 PROCEDURE — 92020 GONIOSCOPY: CPT | Performed by: OPHTHALMOLOGY

## 2023-12-11 PROCEDURE — 92133 CPTRZD OPH DX IMG PST SGM ON: CPT | Performed by: OPHTHALMOLOGY

## 2023-12-11 ASSESSMENT — CONFRONTATIONAL VISUAL FIELD TEST (CVF)
OD_FINDINGS: FULL
OS_FINDINGS: FULL

## 2023-12-11 ASSESSMENT — REFRACTION_AUTOREFRACTION
OD_SPHERE: -1.00
OD_CYLINDER: +1.25
OS_SPHERE: -0.75
OS_CYLINDER: +1.50
OS_AXIS: 92
OD_AXIS: 180

## 2023-12-11 ASSESSMENT — REFRACTION_CURRENTRX
OS_VPRISM_DIRECTION: SV
OD_VPRISM_DIRECTION: SV
OD_CYLINDER: +0.50
OD_SPHERE: +3.00
OD_OVR_VA: 20/
OS_CYLINDER: +0.50
OD_AXIS: 171
OS_OVR_VA: 20/
OS_AXIS: 153
OS_SPHERE: +3.00

## 2023-12-11 ASSESSMENT — SPHEQUIV_DERIVED
OS_SPHEQUIV: 0
OD_SPHEQUIV: -0.375

## 2024-01-04 ENCOUNTER — RESULT CHARGE (OUTPATIENT)
Age: 79
End: 2024-01-04

## 2024-01-05 ENCOUNTER — APPOINTMENT (OUTPATIENT)
Dept: CARDIOLOGY | Facility: CLINIC | Age: 79
End: 2024-01-05
Payer: MEDICARE

## 2024-01-05 ENCOUNTER — NON-APPOINTMENT (OUTPATIENT)
Age: 79
End: 2024-01-05

## 2024-01-05 VITALS
SYSTOLIC BLOOD PRESSURE: 134 MMHG | HEART RATE: 59 BPM | OXYGEN SATURATION: 96 % | DIASTOLIC BLOOD PRESSURE: 60 MMHG | BODY MASS INDEX: 32.08 KG/M2 | WEIGHT: 230 LBS

## 2024-01-05 DIAGNOSIS — I10 ESSENTIAL (PRIMARY) HYPERTENSION: ICD-10-CM

## 2024-01-05 DIAGNOSIS — I25.10 ATHEROSCLEROTIC HEART DISEASE OF NATIVE CORONARY ARTERY W/OUT ANGINA PECTORIS: ICD-10-CM

## 2024-01-05 PROCEDURE — 93306 TTE W/DOPPLER COMPLETE: CPT

## 2024-01-05 PROCEDURE — 99214 OFFICE O/P EST MOD 30 MIN: CPT

## 2024-01-05 PROCEDURE — 93000 ELECTROCARDIOGRAM COMPLETE: CPT

## 2024-01-05 NOTE — REVIEW OF SYSTEMS
[Feeling Fatigued] : feeling fatigued [Chest Discomfort] : chest discomfort [Negative] : Neurological [SOB] : no shortness of breath [Dyspnea on exertion] : not dyspnea during exertion [Lower Ext Edema] : no extremity edema [Leg Claudication] : no intermittent leg claudication [Palpitations] : no palpitations [PND] : no PND [Syncope] : no syncope [FreeTextEntry5] : No chest discomfort during exertion [FreeTextEntry7] : Recent negative colonoscopy with Dr. Valles

## 2024-01-05 NOTE — HISTORY OF PRESENT ILLNESS
[FreeTextEntry1] : Patient is a 78-year-old white male well-known to the practice with a remote history of ischemic heart disease, mild aortic valve disease who presents to the office today for his routine interval follow-up as well as to update his echocardiogram last performed 1 year ago.  He presents today feeling well, he is less active over the winter months but has no new or active cardiovascular complaints or concerns.  He works out on a regular basis walking regularly and playing some tennis and is without any cardiac symptoms.  He notes he recently had a colonoscopy with Dr. Valles that was negative and may not require further follow-up.  Patient has a well-documented cardiac history, he has documented CAD, hypertension, hyperlipidemia, mild aortic and mitral insufficiency    Patient is active he exercises on a regular basis and while active has no exertional symptomatology.  He undergoes stress testing on an annual basis and remains withj a mild degree of a reversible apical defect not associated with EKG changes or symptoms.  Patient had a dramatic initial presentation with acute coronary syndrome in May 2019.  He was taken urgently to [facility] and underwent PTCA and stenting to proximal LAD lesion.  During the procedure there was a small LAD dissection and he required immediate surgery with a LIMA placed to his LAD and a vein to his right coronary artery.  He had a brief episode of paroxysmal atrial fibrillation post intervention that has not recurred and the remainder of his postop hospital course was relatively uneventful.    Patient established his  care with me shortly after that point in time and has been treated aggressively with risk factor modification, annual stress testing and echocardiography to follow his mild aortic insufficiency.  His last echo was in January 2023 and was notable for a normal ejection fraction of 55 to 60%.  A mildly calcified mitral valve annulus with mild mitral valve thickening and mild MR.  His left atrium is mildly enlarged at 4.3 cm.  Aortic valve is mildly thickened and calcified with no stenosis but mild aortic insufficiency with a pressure half-time of 690 m/s.  PA pressures were mildly elevated at 43.  His echocardiogram today was without significant change, his LVEDD was 5.2 cm LVESD 3.7 cm EF preserved at 55-60 and pressure half-time on the aortic valve was slightly reduced at 426 m/s.  Patient presents today noting that he is taking and tolerating his medical regimen.  He is tolerating the recent addition of amlodipine initiated for hypertensive response when he had his stress test.  Otherwise he remains he remains on low-dose aspirin, Crestor at 10 mg/day and Bystolic 5 mg/day.  He is aware that SBE prophylaxis need to be taken before appropriate procedures for his aortic insufficiency and otherwise he presents today looking and feeling well with no new or active medical or cardiovascular concerns at this time.

## 2024-01-05 NOTE — CARDIOLOGY SUMMARY
[de-identified] : (1/1/3/23) ECHOCARDIOGRAPHIC CONCLUSIONS: Left ventricular cavity upper limits of normal LVEDD 5.3 LVESD 3.5.  Mild LVH preserved ejection fraction 55 to 60%..  Normal RV size and function.  Mitral valve mildly calcified with mildly thickened leaflets and 1+ mitral regurgitation.  LAE at 4.3 cm in AP dimension.  Mildly thickened aortic valve that is calcified with no AAS 1+ AI with a pressure half-time of 690 m/s.  Mild tricuspid regurgitation with a PA pressure 43 assuming RA of 5.  (1/5/2024) ECHOCARDIOGRAPHIC CONCLUSIONS:   1. Left ventricular systolic function is normal with an ejection fraction visually estimated at 55 to 60 %. 2. Mild left ventricular hypertrophy. 3. Normal left ventricular diastolic function. 4. The right atrium is mildly dilated in size. 5. Mild mitral valve stenosis. The transmitral peak gradient is 6.0 mmHg and mean gradient is 2.25 mmHg. Mild mitral regurgitation. 6. Mild to moderate tricuspid regurgitation. Estimated pulmonary artery systolic pressure is 40 mmHg, consistent with mild pulmonary hypertension. 7. Moderate aortic regurgitation. 8. Moderate pulmonic regurgitation. 9. No prior echocardiogram is available for comparison.

## 2024-01-05 NOTE — DISCUSSION/SUMMARY
[EKG obtained to assist in diagnosis and management of assessed problem(s)] : EKG obtained to assist in diagnosis and management of assessed problem(s) [FreeTextEntry1] : Patient is a 78-year-old white male with known CAD, status post coronary bypass surgery following a LAD dissection during stent procedure for acute coronary syndrome.  He presents today for a visit and updated echocardiogram which reveals stability of his LV function and his aortic insufficiency has gotten minimally worse by pressure half-time but LV dimensions have remained stable and he is clinically without any new issues.  Patient is doing well, his risk factors ideally modified.  Blood pressure mildly exaggerated in the past and and placed on amlodipine which she is tolerating well.  Blood pressure at today's visit was ideal 134/64  Patient's nuclear stress test has been stable and has been reviewed with him on multiple occasions.  He was reassured that there is adequate LV function, old scar from his intervention and no evidence of active ischemia.  Patient was reassured regarding his cardiovascular status based on his echocardiogram, interval cardiac review of systems, physical exam and EKG today..  No changes were made to his medical regimen he will remain on amlodipine 2.5 along with his other medications.  He was recommended to return at 4-month intervals for routine evaluation.  He was encouraged to lose weight, restrict salt and increase his level of aerobic activity and to discuss with Dr. Wagner considering implementing some type of sleep apnea therapy.  Joel Goldberg, MD, FACC Cc Dr. Dimitri Wagner

## 2024-01-05 NOTE — REASON FOR VISIT
[FreeTextEntry1] : Patient is a 78-year-old white male well-known to my practice who presents the office today for an interval follow-up and an updated echocardiogram to follow progression of his known aortic and mitral valve disease.  Patient was last seen for brief follow-up after  initiating amlodipine to his medical regimen after hypertensive response to stress testing and his known aortic insufficiency.  Patient presents today with no new or active cardiovascular symptoms, continues to complain of excess fatigue but has not yet followed through with a sleep evaluation as he refuses to consider wearing a mask.  He notes that he has been tolerating amlodipine without issue..

## 2024-01-05 NOTE — PHYSICAL EXAM
[Well Developed] : well developed [Well Nourished] : well nourished [No Acute Distress] : no acute distress [Obese] : obese [Normal Conjunctiva] : normal conjunctiva [Normal Venous Pressure] : normal venous pressure [No Carotid Bruit] : no carotid bruit [Normal S1, S2] : normal S1, S2 [No Rub] : no rub [Clear Lung Fields] : clear lung fields [Soft] : abdomen soft [Non Tender] : non-tender [No Masses/organomegaly] : no masses/organomegaly [Normal Gait] : normal gait [No Edema] : no edema [No Cyanosis] : no cyanosis [No Clubbing] : no clubbing [No Rash] : no rash [No Skin Lesions] : no skin lesions [Moves all extremities] : moves all extremities [No Focal Deficits] : no focal deficits [de-identified] : Murmur softly radiating bilaterally [de-identified] : Grade 1/6 to 2/6 basal aortic flow murmur [de-identified] : Mild left upper chest wall tenderness to palpation

## 2024-01-12 ENCOUNTER — NON-APPOINTMENT (OUTPATIENT)
Age: 79
End: 2024-01-12

## 2024-05-24 ENCOUNTER — APPOINTMENT (OUTPATIENT)
Dept: CARDIOLOGY | Facility: CLINIC | Age: 79
End: 2024-05-24
Payer: MEDICARE

## 2024-05-24 ENCOUNTER — NON-APPOINTMENT (OUTPATIENT)
Age: 79
End: 2024-05-24

## 2024-05-24 VITALS
HEART RATE: 60 BPM | BODY MASS INDEX: 33.04 KG/M2 | WEIGHT: 236 LBS | HEIGHT: 71 IN | DIASTOLIC BLOOD PRESSURE: 62 MMHG | SYSTOLIC BLOOD PRESSURE: 120 MMHG | OXYGEN SATURATION: 97 %

## 2024-05-24 DIAGNOSIS — Z98.61 CORONARY ANGIOPLASTY STATUS: ICD-10-CM

## 2024-05-24 DIAGNOSIS — I10 ESSENTIAL (PRIMARY) HYPERTENSION: ICD-10-CM

## 2024-05-24 DIAGNOSIS — Z95.1 PRESENCE OF AORTOCORONARY BYPASS GRAFT: ICD-10-CM

## 2024-05-24 DIAGNOSIS — I35.1 NONRHEUMATIC AORTIC (VALVE) INSUFFICIENCY: ICD-10-CM

## 2024-05-24 DIAGNOSIS — E78.5 HYPERLIPIDEMIA, UNSPECIFIED: ICD-10-CM

## 2024-05-24 DIAGNOSIS — R07.89 OTHER CHEST PAIN: ICD-10-CM

## 2024-05-24 PROCEDURE — 93000 ELECTROCARDIOGRAM COMPLETE: CPT

## 2024-05-24 PROCEDURE — 99214 OFFICE O/P EST MOD 30 MIN: CPT

## 2024-05-24 NOTE — HISTORY OF PRESENT ILLNESS
[FreeTextEntry1] : Patient is a 78-year-old white male who presents to the office today to continue his ongoing care here in [place].  He is well-known to me, he has documented CAD, hypertension, hyperlipidemia, mild aortic and mitral insufficiency .  Patient is active he exercises on a regular basis and while active has no exertional symptomatology.  He undergoes stress testing on an annual basis and remains withj a mild degree of a reversible apical defect not associated with EKG changes or symptoms.  Patient had a dramatic presentation with acute coronary syndrome in May 2019.  He was taken urgently to [facility] and underwent PTCA and stenting to proximal LAD lesion.  During the procedure there was a small LAD dissection and he required immediate surgery with a LIMA placed to his LAD and a vein to his right coronary artery.  He had a brief episode of paroxysmal atrial fibrillation post intervention that has not recurred and the remainder of his postop hospital course was relatively uneventful.  Patient established his  care with me shortly after that point in time and has been treated aggressively with risk factor modification, annual stress testing and echocardiography to follow mild aortic insufficiency on most recent echo with pressures was 426..  His last echo was in January 2023 and was notable for a normal ejection fraction of 55 to 60%.  A mildly calcified mitral valve annulus with mild mitral valve thickening and mild MR.  His left atrium is mildly enlarged at 4.3 cm.  Aortic valve is mildly thickened and calcified with no stenosis last echo peak gradient was 9 but mild aortic insufficiency with a pressure half-time of 690 m/s.  PA pressures were mildly elevated at 43.  Patient presents today noting that he is taking and tolerating his medical regimen.  He remains on low-dose aspirin, Crestor at 10 mg/day and Bystolic 5 mg/day and amlodipine 2.5 mg/day.  He is aware that SBE prophylaxis need to be taken before appropriate procedures for his aortic insufficiency and otherwise aside from the episode at the time of his last visit which is characteristic of musculoskeletal discomfort he was up-to-date with stress testing performed most recently in September and was stable.    Otherwise has no new medical or cardiovascular concerns at this time.

## 2024-05-24 NOTE — REVIEW OF SYSTEMS
[Feeling Fatigued] : feeling fatigued [SOB] : no shortness of breath [Dyspnea on exertion] : not dyspnea during exertion [Chest Discomfort] : chest discomfort [Lower Ext Edema] : no extremity edema [Leg Claudication] : no intermittent leg claudication [Palpitations] : no palpitations [PND] : no PND [Syncope] : no syncope [Negative] : Neurological [FreeTextEntry5] : No chest discomfort during exertion [FreeTextEntry7] : Recent negative colonoscopy with Dr. Valles

## 2024-05-24 NOTE — DISCUSSION/SUMMARY
[FreeTextEntry1] : Patient is a 78-year-old white male with known CAD, status post coronary bypass surgery following a LAD dissection during stent procedure for acute coronary syndrome.  He presents today for a visit and is complaining of fatigue he has gained weight, his A1c is gone up, he is exercising less and he refused to her sleep apnea mask and takes Ambien on a regular basis.  Patient reassured that it is highly unlikely it is his cardiac medication which she would like to implicate but he was otherwise reassured regarding his cardiovascular status, recommended to cut carbohydrates, try to lose weight, seriously consider sleep study in rehab for the mask and reassured that his fatigue is multifactorial and less likely cardiac than multiple other issues.  Patient will continue under the primary care of Dr. Wagner and will follow-up in September for both visit and an updated nuclear stress test which will be scheduled shortly after that visit.  Joel Goldberg, MD, FACC Cc Dr. Steve Tranchina Joel Goldberg, MD, FACC Cc Dr. Dimitri Wagner [EKG obtained to assist in diagnosis and management of assessed problem(s)] : EKG obtained to assist in diagnosis and management of assessed problem(s)

## 2024-05-24 NOTE — PHYSICAL EXAM
[Well Developed] : well developed [Well Nourished] : well nourished [No Acute Distress] : no acute distress [Obese] : obese [Normal Conjunctiva] : normal conjunctiva [Normal Venous Pressure] : normal venous pressure [No Carotid Bruit] : no carotid bruit [Normal S1, S2] : normal S1, S2 [No Rub] : no rub [Clear Lung Fields] : clear lung fields [Soft] : abdomen soft [Non Tender] : non-tender [No Masses/organomegaly] : no masses/organomegaly [Normal Gait] : normal gait [No Edema] : no edema [No Cyanosis] : no cyanosis [No Clubbing] : no clubbing [No Rash] : no rash [No Skin Lesions] : no skin lesions [Moves all extremities] : moves all extremities [No Focal Deficits] : no focal deficits [de-identified] : Murmur softly radiating bilaterally [de-identified] : Grade 1/6 to 2/6 basal aortic flow murmur [de-identified] : Mild left upper chest wall tenderness to palpation

## 2024-05-24 NOTE — CARDIOLOGY SUMMARY
[de-identified] : (1/5/2024)  ECHOCARDIOGRAPHIC CONCLUSIONS:  1. Left ventricular systolic function is normal with an ejection fraction visually estimated at 55 to 60 %. 2. Mild left ventricular hypertrophy. 3. Normal left ventricular diastolic function. 4. The right atrium is mildly dilated in size. 5. Mild mitral valve stenosis. The transmitral peak gradient is 6.0 mmHg and mean gradient is 2.25 mmHg. Mild mitral regurgitation. 6. Mild to moderate tricuspid regurgitation. Estimated pulmonary artery systolic pressure is 40 mmHg, consistent with mild pulmonary hypertension. 7. Moderate aortic regurgitation. 8. Moderate pulmonic regurgitation. 9. No prior echocardiogram is available for comparison.

## 2024-05-24 NOTE — REASON FOR VISIT
[FreeTextEntry1] : Patient is a 78-year-old white male well-known to my practice who presents the office today for an interval follow-up with complaints of increasing fatigue.  Patient is well-known to my practice he was last evaluated here in January when he had both a visit and updated echocardiogram to follow progression of his known aortic and mitral valve disease.  Patient was last seen for brief follow-up after  initiating amlodipine to his medical regimen after hypertensive response to stress testing and his known aortic insufficiency.  Patient presents today with no new or active cardiovascular symptoms, continues to complain of excess fatigue but has not yet followed through with a sleep evaluation as he refuses to consider wearing a mask.  He notes that he follows with Dr. Sachi Mcintosh is his primary care physician and that his hemoglobin A1c has been rising, he has been less active over the winter months and eating poorly.  He does note however that he has  has been tolerating amlodipine without issue..

## 2024-07-15 ENCOUNTER — RX RENEWAL (OUTPATIENT)
Age: 79
End: 2024-07-15

## 2024-09-12 ENCOUNTER — APPOINTMENT (OUTPATIENT)
Dept: CARDIOLOGY | Facility: CLINIC | Age: 79
End: 2024-09-12
Payer: MEDICARE

## 2024-09-12 ENCOUNTER — NON-APPOINTMENT (OUTPATIENT)
Age: 79
End: 2024-09-12

## 2024-09-12 VITALS — HEART RATE: 56 BPM | DIASTOLIC BLOOD PRESSURE: 60 MMHG | OXYGEN SATURATION: 96 % | SYSTOLIC BLOOD PRESSURE: 138 MMHG

## 2024-09-12 DIAGNOSIS — I10 ESSENTIAL (PRIMARY) HYPERTENSION: ICD-10-CM

## 2024-09-12 DIAGNOSIS — I35.1 NONRHEUMATIC AORTIC (VALVE) INSUFFICIENCY: ICD-10-CM

## 2024-09-12 DIAGNOSIS — Z95.1 PRESENCE OF AORTOCORONARY BYPASS GRAFT: ICD-10-CM

## 2024-09-12 DIAGNOSIS — Z98.61 CORONARY ANGIOPLASTY STATUS: ICD-10-CM

## 2024-09-12 DIAGNOSIS — R07.89 OTHER CHEST PAIN: ICD-10-CM

## 2024-09-12 DIAGNOSIS — E78.5 HYPERLIPIDEMIA, UNSPECIFIED: ICD-10-CM

## 2024-09-12 DIAGNOSIS — I25.10 ATHEROSCLEROTIC HEART DISEASE OF NATIVE CORONARY ARTERY W/OUT ANGINA PECTORIS: ICD-10-CM

## 2024-09-12 PROCEDURE — 99214 OFFICE O/P EST MOD 30 MIN: CPT

## 2024-09-12 PROCEDURE — 93000 ELECTROCARDIOGRAM COMPLETE: CPT

## 2024-09-15 NOTE — HISTORY OF PRESENT ILLNESS
[FreeTextEntry1] : Patient is a 78-year-old white male who presents to the office today to continue his ongoing cardiac care.  He is well-known to me, he has documented CAD, hypertension, hyperlipidemia, mild aortic and mitral insufficiency and presents today with no new complaints.  When he last was here he noted with an episode of superficial left upper chest discomfort that he noted after asymptomatically playing tennis the day of that visit.  Patient is active he exercises on a regular basis and while active has no exertional symptomatology.  He undergoes stress testing on an annual basis and remains with a mild degree of a reversible apical defect not associated with EKG changes or symptoms.  Patient had a dramatic presentation in May 2019 when he presented to a local hospital in [place] with acute coronary syndrome   He was taken urgently to [facility] and underwent PTCA and stenting to proximal LAD lesion.  During the procedure there was a small LAD dissection and he required immediate surgery with a LIMA placed to his LAD and a vein to his right coronary artery.  He had a brief episode of paroxysmal atrial fibrillation post intervention that has not recurred and the remainder of his postop hospital course was relatively uneventful.  Patient established his  care with me shortly after that point in time and has been treated aggressively with risk factor modification, annual stress testing and echocardiography to follow mild aortic insufficiency.  His last echo was in January 2024 (see cardiology summary) and was notable for a normal ejection fraction of 55 to 60%.  A mildly calcified mitral valve annulus with mild mitral valve thickening and mild MR.  His left atrium is mildly enlarged at 4.3 cm.  Aortic valve is mildly thickened and calcified with no stenosis but mild aortic insufficiency with a pressure half-time of 690 m/s.  PA pressures were mildly elevated at 40.  Patient presents today noting that he is taking and tolerating his medical regimen.  He remains on low-dose aspirin, Crestor at 10 mg/day and Bystolic 5 mg/day.  He is aware that SBE prophylaxis need to be taken before appropriate procedures for his aortic insufficiency and otherwise aside from the episode today which is characteristic of musculoskeletal discomfort he is due for stress testing which will be coordinated near his home in [place].  Otherwise has no new medical or cardiovascular concerns at this time.  He once again refused to consider a sleep study nor would he agreed to wear a mask if he is posi

## 2024-09-15 NOTE — DISCUSSION/SUMMARY
[EKG obtained to assist in diagnosis and management of assessed problem(s)] : EKG obtained to assist in diagnosis and management of assessed problem(s) [FreeTextEntry1] : Patient is a 78-year-old white male with known CAD, status post coronary bypass surgery following a LAD dissection during stent procedure for acute coronary syndrome.  He presents today for a routine interval follow-up with no new or active cardiac concerns or complaints.  He remains active he plays some tennis he does some walking and has no cardiovascular symptoms.  Patient was recommended to schedule back in the office after September during which time he was recommended to update his nuclear stress test which she had last September.  Patient otherwise was reassured, recommended to increase his level of aerobic activity and schedule back in the office for stress testing and follow-up at the end of September.  Joel Goldberg, MD, FACC   Patient's nuclear stress test has been stable and has been reviewed with him on multiple occasions.  He was reassured that there is adequate LV function, old scar from his intervention and no evidence of active ischemia.  Patient was reassured regarding his cardiovascular status based on his echocardiogram, interval cardiac review of systems, physical exam and EKG today..  No changes were made to his medical regimen he will remain on amlodipine 2.5 along with his other medications.  He was recommended to return at 4-month intervals for routine evaluation but to have his stress test close his anniversary of last stress test in September..  He was encouraged to lose weight, restrict salt and increase his level of aerobic activity and to discuss with Dr. Wagner considering implementing some type of sleep apnea therapy.  Joel Goldberg, MD, FACC Cc Dr. Dimitri Wagner

## 2024-09-15 NOTE — CARDIOLOGY SUMMARY
[de-identified] : (9/12/2024) EKG: Sinus bradycardia at 54 bpm with a frontal QRS axis of +15 degree.  Right IVCD, possible LAE, nonspecific ST-T changes, decreased R wave laterally [de-identified] : (1/5/2024) ECHOCARDIOGRAPHIC CONCLUSIONS:  1. Left ventricular systolic function is normal with an ejection fraction visually estimated at 55 to 60 %. 2. Mild left ventricular hypertrophy. 3. Normal left ventricular diastolic function. 4. The right atrium is mildly dilated in size. 5. Mild mitral valve stenosis. The transmitral peak gradient is 6.0 mmHg and mean gradient is 2.25 mmHg. Mild mitral regurgitation. 6. Mild to moderate tricuspid regurgitation. Estimated pulmonary artery systolic pressure is 40 mmHg, consistent with mild pulmonary hypertension. 7. Moderate aortic regurgitation. 8. Moderate pulmonic regurgitation. 9. No prior echocardiogram is available for comparison.  _____________________________

## 2024-09-15 NOTE — PHYSICAL EXAM
[Well Developed] : well developed [Well Nourished] : well nourished [No Acute Distress] : no acute distress [Obese] : obese [Normal Conjunctiva] : normal conjunctiva [Normal Venous Pressure] : normal venous pressure [No Carotid Bruit] : no carotid bruit [Normal S1, S2] : normal S1, S2 [No Rub] : no rub [Clear Lung Fields] : clear lung fields [Soft] : abdomen soft [Non Tender] : non-tender [No Masses/organomegaly] : no masses/organomegaly [Normal Gait] : normal gait [No Edema] : no edema [No Cyanosis] : no cyanosis [No Clubbing] : no clubbing [No Rash] : no rash [No Skin Lesions] : no skin lesions [Moves all extremities] : moves all extremities [No Focal Deficits] : no focal deficits [de-identified] : Murmur softly radiating bilaterally [de-identified] : Grade 1/6 to 2/6 basal aortic flow murmur [de-identified] : Mild left upper chest wall tenderness to palpation

## 2024-09-15 NOTE — CARDIOLOGY SUMMARY
[de-identified] : (9/12/2024) EKG: Sinus bradycardia at 54 bpm with a frontal QRS axis of +15 degree.  Right IVCD, possible LAE, nonspecific ST-T changes, decreased R wave laterally [de-identified] : (1/5/2024) ECHOCARDIOGRAPHIC CONCLUSIONS:  1. Left ventricular systolic function is normal with an ejection fraction visually estimated at 55 to 60 %. 2. Mild left ventricular hypertrophy. 3. Normal left ventricular diastolic function. 4. The right atrium is mildly dilated in size. 5. Mild mitral valve stenosis. The transmitral peak gradient is 6.0 mmHg and mean gradient is 2.25 mmHg. Mild mitral regurgitation. 6. Mild to moderate tricuspid regurgitation. Estimated pulmonary artery systolic pressure is 40 mmHg, consistent with mild pulmonary hypertension. 7. Moderate aortic regurgitation. 8. Moderate pulmonic regurgitation. 9. No prior echocardiogram is available for comparison.  _____________________________

## 2024-09-15 NOTE — HISTORY OF PRESENT ILLNESS
[FreeTextEntry1] : Patient is a 78-year-old white male who presents to the office today to continue his ongoing cardiac care.  He is well-known to me, he has documented CAD, hypertension, hyperlipidemia, mild aortic and mitral insufficiency and presents today with no new complaints.  When he last was here he noted with an episode of superficial left upper chest discomfort that he noted after asymptomatically playing tennis the day of that visit.  Patient is active he exercises on a regular basis and while active has no exertional symptomatology.  He undergoes stress testing on an annual basis and remains with a mild degree of a reversible apical defect not associated with EKG changes or symptoms.  Patient had a dramatic presentation in May 2019 when he presented to a local hospital in [place] with acute coronary syndrome   He was taken urgently to [facility] and underwent PTCA and stenting to proximal LAD lesion.  During the procedure there was a small LAD dissection and he required immediate surgery with a LIMA placed to his LAD and a vein to his right coronary artery.  He had a brief episode of paroxysmal atrial fibrillation post intervention that has not recurred and the remainder of his postop hospital course was relatively uneventful.  Patient established his  care with me shortly after that point in time and has been treated aggressively with risk factor modification, annual stress testing and echocardiography to follow mild aortic insufficiency.  His last echo was in January 2024 (see cardiology summary) and was notable for a normal ejection fraction of 55 to 60%.  A mildly calcified mitral valve annulus with mild mitral valve thickening and mild MR.  His left atrium is mildly enlarged at 4.3 cm.  Aortic valve is mildly thickened and calcified with no stenosis but mild aortic insufficiency with a pressure half-time of 690 m/s.  PA pressures were mildly elevated at 40.  Patient presents today noting that he is taking and tolerating his medical regimen.  He remains on low-dose aspirin, Crestor at 10 mg/day and Bystolic 5 mg/day.  He is aware that SBE prophylaxis need to be taken before appropriate procedures for his aortic insufficiency and otherwise aside from the episode today which is characteristic of musculoskeletal discomfort he is due for stress testing which will be coordinated near his home in [place].  Otherwise has no new medical or cardiovascular concerns at this time.  He once again refused to consider a sleep study nor would he agreed to wear a mask if he is posi

## 2024-09-15 NOTE — PHYSICAL EXAM
[Well Developed] : well developed [Well Nourished] : well nourished [No Acute Distress] : no acute distress [Obese] : obese [Normal Conjunctiva] : normal conjunctiva [Normal Venous Pressure] : normal venous pressure [No Carotid Bruit] : no carotid bruit [Normal S1, S2] : normal S1, S2 [No Rub] : no rub [Clear Lung Fields] : clear lung fields [Soft] : abdomen soft [Non Tender] : non-tender [No Masses/organomegaly] : no masses/organomegaly [Normal Gait] : normal gait [No Edema] : no edema [No Cyanosis] : no cyanosis [No Clubbing] : no clubbing [No Rash] : no rash [No Skin Lesions] : no skin lesions [Moves all extremities] : moves all extremities [No Focal Deficits] : no focal deficits [de-identified] : Grade 1/6 to 2/6 basal aortic flow murmur [de-identified] : Murmur softly radiating bilaterally [de-identified] : Mild left upper chest wall tenderness to palpation

## 2024-09-15 NOTE — REASON FOR VISIT
[CV Risk Factors and Non-Cardiac Disease] : CV risk factors and non-cardiac disease [Hyperlipidemia] : hyperlipidemia [Hypertension] : hypertension [Coronary Artery Disease] : coronary artery disease [FreeTextEntry3] : Dr. Wagner [FreeTextEntry1] : Patient is a 78-year-old white male well-known to my practice who presents the office today for an interval follow-up with no new complaints.  Patient was last evaluated here in May which time he was complaining of excess of fatigue but those symptoms seems to have resolved.    Patient is well-known to my practice he was last evaluated here in May when he had both a visit and previously had an updated echocardiogram to follow progression of his known aortic and mitral valve disease.  Patient was last seen for brief follow-up after  initiating amlodipine to his medical regimen after hypertensive response to stress testing and his known aortic insufficiency.  He has significant prior history of ischemic heart disease as outlined below and he will be due for upcoming stress testing at the time of next visit.  Patient presents today with no new or active cardiovascular symptoms, he has chronic excess fatigue but has not yet followed through with a sleep evaluation as he refuses to consider wearing a mask.  He notes that he follows with Dr. Wagner is his primary care physician and that his hemoglobin A1c has been rising, he has been less active over the winter months and eating poorly.  He does note however that he has  has been tolerating amlodipine without issue..

## 2024-10-17 ENCOUNTER — OUTPATIENT (OUTPATIENT)
Dept: OUTPATIENT SERVICES | Facility: HOSPITAL | Age: 79
LOS: 1 days | End: 2024-10-17
Payer: COMMERCIAL

## 2024-10-17 ENCOUNTER — APPOINTMENT (OUTPATIENT)
Dept: CV DIAGNOSTICS | Facility: HOSPITAL | Age: 79
End: 2024-10-17

## 2024-10-17 ENCOUNTER — RESULT REVIEW (OUTPATIENT)
Age: 79
End: 2024-10-17

## 2024-10-17 DIAGNOSIS — Z95.1 PRESENCE OF AORTOCORONARY BYPASS GRAFT: Chronic | ICD-10-CM

## 2024-10-17 DIAGNOSIS — Z98.61 CORONARY ANGIOPLASTY STATUS: ICD-10-CM

## 2024-10-17 DIAGNOSIS — Z98.890 OTHER SPECIFIED POSTPROCEDURAL STATES: Chronic | ICD-10-CM

## 2024-10-17 DIAGNOSIS — Z98.61 CORONARY ANGIOPLASTY STATUS: Chronic | ICD-10-CM

## 2024-10-17 PROCEDURE — 93018 CV STRESS TEST I&R ONLY: CPT | Mod: MC

## 2024-10-17 PROCEDURE — 93016 CV STRESS TEST SUPVJ ONLY: CPT | Mod: MC

## 2024-10-17 PROCEDURE — 78452 HT MUSCLE IMAGE SPECT MULT: CPT | Mod: 26,MC

## 2024-11-20 PROCEDURE — 93017 CV STRESS TEST TRACING ONLY: CPT

## 2024-11-20 PROCEDURE — A9500: CPT

## 2024-11-20 PROCEDURE — 78452 HT MUSCLE IMAGE SPECT MULT: CPT | Mod: MC

## 2024-12-12 ENCOUNTER — OFFICE (OUTPATIENT)
Dept: URBAN - METROPOLITAN AREA CLINIC 27 | Facility: CLINIC | Age: 79
Setting detail: OPHTHALMOLOGY
End: 2024-12-12
Payer: MEDICARE

## 2024-12-12 DIAGNOSIS — D18.01: ICD-10-CM

## 2024-12-12 DIAGNOSIS — H40.033: ICD-10-CM

## 2024-12-12 DIAGNOSIS — H25.13: ICD-10-CM

## 2024-12-12 DIAGNOSIS — H43.813: ICD-10-CM

## 2024-12-12 PROCEDURE — 92014 COMPRE OPH EXAM EST PT 1/>: CPT | Performed by: OPHTHALMOLOGY

## 2024-12-12 ASSESSMENT — REFRACTION_CURRENTRX
OS_CYLINDER: +0.50
OS_SPHERE: +3.00
OD_VPRISM_DIRECTION: SV
OD_OVR_VA: 20/
OS_SPHERE: +3.00
OD_SPHERE: +3.00
OD_AXIS: 001
OS_AXIS: 153
OS_VPRISM_DIRECTION: SV
OD_CYLINDER: +0.50
OS_OVR_VA: 20/
OS_OVR_VA: 20/
OD_OVR_VA: 20/
OD_SPHERE: +3.00

## 2024-12-12 ASSESSMENT — REFRACTION_AUTOREFRACTION
OS_CYLINDER: +0.75
OS_SPHERE: PLANO
OD_CYLINDER: +0.75
OS_AXIS: 143
OD_AXIS: 177
OD_SPHERE: -2.25

## 2024-12-12 ASSESSMENT — CONFRONTATIONAL VISUAL FIELD TEST (CVF)
OD_FINDINGS: FULL
OS_FINDINGS: FULL

## 2024-12-12 ASSESSMENT — KERATOMETRY
OD_AXISANGLE_DEGREES: 022
OD_K1POWER_DIOPTERS: 42.50
OS_K2POWER_DIOPTERS: 42.25
OS_AXISANGLE_DEGREES: 116
METHOD_AUTO_MANUAL: AUTO
OD_K2POWER_DIOPTERS: 43.00
OS_K1POWER_DIOPTERS: 41.00

## 2024-12-12 ASSESSMENT — VISUAL ACUITY
OD_BCVA: 20/25-1
OS_BCVA: 20/60-2+2

## 2024-12-12 ASSESSMENT — TONOMETRY
OD_IOP_MMHG: 13
OS_IOP_MMHG: 14

## 2025-01-12 ENCOUNTER — NON-APPOINTMENT (OUTPATIENT)
Age: 80
End: 2025-01-12

## 2025-01-17 ENCOUNTER — APPOINTMENT (OUTPATIENT)
Dept: CARDIOLOGY | Facility: CLINIC | Age: 80
End: 2025-01-17
Payer: MEDICARE

## 2025-01-17 ENCOUNTER — NON-APPOINTMENT (OUTPATIENT)
Age: 80
End: 2025-01-17

## 2025-01-17 VITALS
DIASTOLIC BLOOD PRESSURE: 72 MMHG | OXYGEN SATURATION: 95 % | HEART RATE: 60 BPM | WEIGHT: 230 LBS | HEIGHT: 71 IN | BODY MASS INDEX: 32.2 KG/M2 | SYSTOLIC BLOOD PRESSURE: 128 MMHG

## 2025-01-17 DIAGNOSIS — Z98.61 CORONARY ANGIOPLASTY STATUS: ICD-10-CM

## 2025-01-17 DIAGNOSIS — Z95.1 PRESENCE OF AORTOCORONARY BYPASS GRAFT: ICD-10-CM

## 2025-01-17 DIAGNOSIS — I25.10 ATHEROSCLEROTIC HEART DISEASE OF NATIVE CORONARY ARTERY W/OUT ANGINA PECTORIS: ICD-10-CM

## 2025-01-17 DIAGNOSIS — I10 ESSENTIAL (PRIMARY) HYPERTENSION: ICD-10-CM

## 2025-01-17 DIAGNOSIS — I35.1 NONRHEUMATIC AORTIC (VALVE) INSUFFICIENCY: ICD-10-CM

## 2025-01-17 DIAGNOSIS — E78.5 HYPERLIPIDEMIA, UNSPECIFIED: ICD-10-CM

## 2025-01-17 PROCEDURE — 99214 OFFICE O/P EST MOD 30 MIN: CPT

## 2025-01-17 PROCEDURE — 93000 ELECTROCARDIOGRAM COMPLETE: CPT

## 2025-01-17 PROCEDURE — 93306 TTE W/DOPPLER COMPLETE: CPT

## 2025-05-06 ENCOUNTER — NON-APPOINTMENT (OUTPATIENT)
Age: 80
End: 2025-05-06

## 2025-05-22 ENCOUNTER — RX RENEWAL (OUTPATIENT)
Age: 80
End: 2025-05-22

## 2025-05-29 ENCOUNTER — NON-APPOINTMENT (OUTPATIENT)
Age: 80
End: 2025-05-29

## 2025-05-30 ENCOUNTER — APPOINTMENT (OUTPATIENT)
Dept: CARDIOLOGY | Facility: CLINIC | Age: 80
End: 2025-05-30
Payer: MEDICARE

## 2025-05-30 ENCOUNTER — NON-APPOINTMENT (OUTPATIENT)
Age: 80
End: 2025-05-30

## 2025-05-30 VITALS
BODY MASS INDEX: 31.1 KG/M2 | WEIGHT: 223 LBS | HEART RATE: 53 BPM | SYSTOLIC BLOOD PRESSURE: 112 MMHG | OXYGEN SATURATION: 94 % | DIASTOLIC BLOOD PRESSURE: 56 MMHG

## 2025-05-30 DIAGNOSIS — E78.5 HYPERLIPIDEMIA, UNSPECIFIED: ICD-10-CM

## 2025-05-30 DIAGNOSIS — I10 ESSENTIAL (PRIMARY) HYPERTENSION: ICD-10-CM

## 2025-05-30 DIAGNOSIS — I25.10 ATHEROSCLEROTIC HEART DISEASE OF NATIVE CORONARY ARTERY W/OUT ANGINA PECTORIS: ICD-10-CM

## 2025-05-30 DIAGNOSIS — Z98.61 CORONARY ANGIOPLASTY STATUS: ICD-10-CM

## 2025-05-30 DIAGNOSIS — Z95.1 PRESENCE OF AORTOCORONARY BYPASS GRAFT: ICD-10-CM

## 2025-05-30 DIAGNOSIS — I35.1 NONRHEUMATIC AORTIC (VALVE) INSUFFICIENCY: ICD-10-CM

## 2025-05-30 PROCEDURE — 93000 ELECTROCARDIOGRAM COMPLETE: CPT

## 2025-05-30 PROCEDURE — 99214 OFFICE O/P EST MOD 30 MIN: CPT

## 2025-05-30 RX ORDER — METFORMIN HYDROCHLORIDE 500 MG/1
500 TABLET, COATED ORAL DAILY
Refills: 0 | Status: ACTIVE | COMMUNITY

## 2025-08-29 ENCOUNTER — APPOINTMENT (OUTPATIENT)
Dept: CARDIOLOGY | Facility: CLINIC | Age: 80
End: 2025-08-29
Payer: MEDICARE

## 2025-08-29 VITALS
OXYGEN SATURATION: 94 % | DIASTOLIC BLOOD PRESSURE: 52 MMHG | SYSTOLIC BLOOD PRESSURE: 120 MMHG | BODY MASS INDEX: 30.54 KG/M2 | HEART RATE: 63 BPM | WEIGHT: 219 LBS

## 2025-08-29 DIAGNOSIS — I25.10 ATHEROSCLEROTIC HEART DISEASE OF NATIVE CORONARY ARTERY W/OUT ANGINA PECTORIS: ICD-10-CM

## 2025-08-29 DIAGNOSIS — E78.5 HYPERLIPIDEMIA, UNSPECIFIED: ICD-10-CM

## 2025-08-29 DIAGNOSIS — I10 ESSENTIAL (PRIMARY) HYPERTENSION: ICD-10-CM

## 2025-08-29 DIAGNOSIS — I35.1 NONRHEUMATIC AORTIC (VALVE) INSUFFICIENCY: ICD-10-CM

## 2025-08-29 DIAGNOSIS — Z95.1 PRESENCE OF AORTOCORONARY BYPASS GRAFT: ICD-10-CM

## 2025-08-29 DIAGNOSIS — Z01.818 ENCOUNTER FOR OTHER PREPROCEDURAL EXAMINATION: ICD-10-CM

## 2025-08-29 DIAGNOSIS — Z98.61 CORONARY ANGIOPLASTY STATUS: ICD-10-CM

## 2025-08-29 PROCEDURE — 93000 ELECTROCARDIOGRAM COMPLETE: CPT | Mod: NC

## 2025-08-29 PROCEDURE — 99214 OFFICE O/P EST MOD 30 MIN: CPT

## 2025-08-31 LAB — HBA1C MFR BLD HPLC: 6.4

## 2025-09-03 ENCOUNTER — NON-APPOINTMENT (OUTPATIENT)
Age: 80
End: 2025-09-03

## 2025-09-05 LAB — HBA1C MFR BLD HPLC: 6
